# Patient Record
Sex: MALE | Race: BLACK OR AFRICAN AMERICAN | NOT HISPANIC OR LATINO | Employment: FULL TIME | URBAN - METROPOLITAN AREA
[De-identification: names, ages, dates, MRNs, and addresses within clinical notes are randomized per-mention and may not be internally consistent; named-entity substitution may affect disease eponyms.]

---

## 2023-05-29 ENCOUNTER — HOSPITAL ENCOUNTER (EMERGENCY)
Facility: HOSPITAL | Age: 33
Discharge: HOME | End: 2023-05-29
Attending: EMERGENCY MEDICINE
Payer: COMMERCIAL

## 2023-05-29 VITALS
BODY MASS INDEX: 45.1 KG/M2 | OXYGEN SATURATION: 97 % | HEIGHT: 70 IN | RESPIRATION RATE: 18 BRPM | WEIGHT: 315 LBS | DIASTOLIC BLOOD PRESSURE: 56 MMHG | HEART RATE: 90 BPM | TEMPERATURE: 99 F | SYSTOLIC BLOOD PRESSURE: 126 MMHG

## 2023-05-29 DIAGNOSIS — S83.421A SPRAIN OF LATERAL COLLATERAL LIGAMENT OF RIGHT KNEE, INITIAL ENCOUNTER: Primary | ICD-10-CM

## 2023-05-29 PROCEDURE — 63600175 PHARM REV CODE 636 W HCPCS: Performed by: EMERGENCY MEDICINE

## 2023-05-29 PROCEDURE — 99284 PR EMERGENCY DEPT VISIT,LEVEL IV: ICD-10-PCS | Mod: ,,, | Performed by: EMERGENCY MEDICINE

## 2023-05-29 PROCEDURE — 99284 EMERGENCY DEPT VISIT MOD MDM: CPT | Mod: ,,, | Performed by: EMERGENCY MEDICINE

## 2023-05-29 PROCEDURE — 96372 THER/PROPH/DIAG INJ SC/IM: CPT | Performed by: EMERGENCY MEDICINE

## 2023-05-29 PROCEDURE — 99284 EMERGENCY DEPT VISIT MOD MDM: CPT | Mod: 25

## 2023-05-29 RX ORDER — KETOROLAC TROMETHAMINE 30 MG/ML
30 INJECTION, SOLUTION INTRAMUSCULAR; INTRAVENOUS
Status: COMPLETED | OUTPATIENT
Start: 2023-05-29 | End: 2023-05-29

## 2023-05-29 RX ORDER — HYDROCODONE BITARTRATE AND ACETAMINOPHEN 5; 325 MG/1; MG/1
1 TABLET ORAL EVERY 4 HOURS PRN
Qty: 18 TABLET | Refills: 0 | Status: SHIPPED | OUTPATIENT
Start: 2023-05-29

## 2023-05-29 RX ORDER — KETOROLAC TROMETHAMINE 10 MG/1
10 TABLET, FILM COATED ORAL EVERY 8 HOURS PRN
Qty: 15 TABLET | Refills: 0 | Status: SHIPPED | OUTPATIENT
Start: 2023-05-29

## 2023-05-29 RX ORDER — ONDANSETRON 4 MG/1
4 TABLET, ORALLY DISINTEGRATING ORAL EVERY 6 HOURS PRN
Qty: 10 TABLET | Refills: 0 | Status: SHIPPED | OUTPATIENT
Start: 2023-05-29

## 2023-05-29 RX ADMIN — KETOROLAC TROMETHAMINE 30 MG: 30 INJECTION, SOLUTION INTRAMUSCULAR; INTRAVENOUS at 07:05

## 2023-05-29 NOTE — DISCHARGE INSTRUCTIONS
For regular pain:  Take Tylenol 650 mg every 4-6 hours. Also take either Aleve 220 mg every 12 hours OR ibuprofen 600-800 mg every 6 hours.    For moderate to severe pain:  Prescribed pain medication.

## 2023-05-29 NOTE — ED PROVIDER NOTES
Encounter Date: 5/29/2023       History     Chief Complaint   Patient presents with    Knee Injury     r     32-year-old with no significant past medical history.  Complains of acute lateral right knee pain that began approximately an hour before arrival.  He was stretching the joint by having it flexed at the knee with the sole of his right foot against the inner surface of his left thigh when he began to feel a pulling sensation at the area of complaint.  He immediately fully extended the right knee.  He is now having lateral pain with movement, standing, and ambulating.  He denies blunt trauma to the joint.  He denies weakness and numbness throughout the extremity.      Review of patient's allergies indicates:  No Known Allergies  No past medical history on file.  No past surgical history on file.  No family history on file.  Social History     Tobacco Use    Smoking status: Never   Substance Use Topics    Alcohol use: No    Drug use: No     Review of Systems  See HPI.      Physical Exam     Initial Vitals [05/29/23 1845]   BP Pulse Resp Temp SpO2   (!) 126/56 90 18 98.5 °F (36.9 °C) 97 %      MAP       --         Physical Exam    Right knee:  No swelling.  No overlying rashes or lesions.  Lateral point tenderness.  Full passive range of motion.  Lateral joint pain with ranging.    No right thigh tenderness.  No right lower leg tenderness.    ED Course   Procedures  Labs Reviewed   HIV 1 / 2 ANTIBODY   HEPATITIS C ANTIBODY          Imaging Results    None          Medications   ketorolac injection 30 mg (has no administration in time range)                              Clinical Impression:   Final diagnoses:  [S83.421A] Sprain of lateral collateral ligament of right knee, initial encounter (Primary)        ED Disposition Condition    Discharge Stable          ED Prescriptions       Medication Sig Dispense Start Date End Date Auth. Provider    ketorolac (TORADOL) 10 mg tablet Take 1 tablet (10 mg total) by mouth  every 8 (eight) hours as needed for Pain. 15 tablet 5/29/2023 -- Som Greenberg III, MD    HYDROcodone-acetaminophen (NORCO) 5-325 mg per tablet Take 1 tablet by mouth every 4 (four) hours as needed (Moderate to severe pain). 18 tablet 5/29/2023 -- Som Greenberg III, MD    ondansetron (ZOFRAN-ODT) 4 MG TbDL Take 1 tablet (4 mg total) by mouth every 6 (six) hours as needed (nausea). 10 tablet 5/29/2023 -- Som Greenberg III, MD          Follow-up Information       Follow up With Specialties Details Why Contact Info    Your primary care provider   Follow-up with your primary care provider in about a week.  You may need referral to a specialist if you continued to have this pain despite rest, pain medications, and other conservative measures.     ER   Return to the ER for worsened symptoms or for any other concerns.              Som Greenberg III, MD  05/29/23 [id number]

## 2023-07-19 ENCOUNTER — PATIENT MESSAGE (OUTPATIENT)
Dept: RESEARCH | Facility: HOSPITAL | Age: 33
End: 2023-07-19
Payer: COMMERCIAL

## 2023-07-31 ENCOUNTER — PATIENT MESSAGE (OUTPATIENT)
Dept: RESEARCH | Facility: HOSPITAL | Age: 33
End: 2023-07-31
Payer: COMMERCIAL